# Patient Record
Sex: FEMALE | Race: WHITE | NOT HISPANIC OR LATINO | Employment: FULL TIME | URBAN - METROPOLITAN AREA
[De-identification: names, ages, dates, MRNs, and addresses within clinical notes are randomized per-mention and may not be internally consistent; named-entity substitution may affect disease eponyms.]

---

## 2023-09-25 ENCOUNTER — HOSPITAL ENCOUNTER (OUTPATIENT)
Dept: DATA CONVERSION | Facility: HOSPITAL | Age: 23
Discharge: HOME | End: 2023-09-25
Payer: MEDICAID

## 2023-09-25 DIAGNOSIS — Z11.3 ENCOUNTER FOR SCREENING FOR INFECTIONS WITH A PREDOMINANTLY SEXUAL MODE OF TRANSMISSION: ICD-10-CM

## 2023-09-25 DIAGNOSIS — Z01.419 ENCOUNTER FOR GYNECOLOGICAL EXAMINATION (GENERAL) (ROUTINE) WITHOUT ABNORMAL FINDINGS: ICD-10-CM

## 2023-09-26 LAB
C TRACH RRNA SPEC QL NAA+PROBE: ABNORMAL
DRUG SCREEN COMMENT UR-IMP: ABNORMAL
N GONORRHOEA RRNA SPEC QL NAA+PROBE: ABNORMAL
SPECIMEN SOURCE: ABNORMAL

## 2023-10-26 ENCOUNTER — LAB REQUISITION (OUTPATIENT)
Dept: LAB | Facility: HOSPITAL | Age: 23
End: 2023-10-26
Payer: MEDICAID

## 2023-10-26 DIAGNOSIS — A74.9 CHLAMYDIAL INFECTION, UNSPECIFIED: ICD-10-CM

## 2023-10-26 DIAGNOSIS — Z20.2 CONTACT WITH AND (SUSPECTED) EXPOSURE TO INFECTIONS WITH A PREDOMINANTLY SEXUAL MODE OF TRANSMISSION: ICD-10-CM

## 2023-10-26 DIAGNOSIS — Z11.3 ENCOUNTER FOR SCREENING FOR INFECTIONS WITH A PREDOMINANTLY SEXUAL MODE OF TRANSMISSION: ICD-10-CM

## 2023-10-26 PROCEDURE — 87800 DETECT AGNT MULT DNA DIREC: CPT

## 2023-10-27 LAB
C TRACH RRNA SPEC QL NAA+PROBE: NEGATIVE
N GONORRHOEA DNA SPEC QL PROBE+SIG AMP: NEGATIVE

## 2024-09-20 ENCOUNTER — OFFICE VISIT (OUTPATIENT)
Dept: URGENT CARE | Facility: URGENT CARE | Age: 24
End: 2024-09-20
Payer: COMMERCIAL

## 2024-09-20 VITALS
HEART RATE: 108 BPM | OXYGEN SATURATION: 98 % | TEMPERATURE: 98.7 F | SYSTOLIC BLOOD PRESSURE: 168 MMHG | WEIGHT: 160.5 LBS | BODY MASS INDEX: 26.71 KG/M2 | DIASTOLIC BLOOD PRESSURE: 123 MMHG | RESPIRATION RATE: 18 BRPM

## 2024-09-20 DIAGNOSIS — L03.90 CELLULITIS, UNSPECIFIED CELLULITIS SITE: ICD-10-CM

## 2024-09-20 DIAGNOSIS — L02.419 AXILLARY ABSCESS: Primary | ICD-10-CM

## 2024-09-20 PROCEDURE — 87205 SMEAR GRAM STAIN: CPT

## 2024-09-20 PROCEDURE — 87186 SC STD MICRODIL/AGAR DIL: CPT

## 2024-09-20 PROCEDURE — 87070 CULTURE OTHR SPECIMN AEROBIC: CPT

## 2024-09-20 PROCEDURE — 87075 CULTR BACTERIA EXCEPT BLOOD: CPT

## 2024-09-20 PROCEDURE — 87077 CULTURE AEROBIC IDENTIFY: CPT

## 2024-09-20 RX ORDER — LEVONORGESTREL AND ETHINYL ESTRADIOL 0.1-0.02MG
1 KIT ORAL DAILY
COMMUNITY

## 2024-09-20 RX ORDER — CLINDAMYCIN HYDROCHLORIDE 300 MG/1
300 CAPSULE ORAL 3 TIMES DAILY
Qty: 30 CAPSULE | Refills: 0 | Status: SHIPPED | OUTPATIENT
Start: 2024-09-20 | End: 2024-09-30

## 2024-09-20 RX ORDER — MUPIROCIN 20 MG/G
OINTMENT TOPICAL 3 TIMES DAILY
Qty: 22 G | Refills: 0 | Status: SHIPPED | OUTPATIENT
Start: 2024-09-20 | End: 2024-09-30

## 2024-09-20 RX ORDER — LAMOTRIGINE 150 MG/1
2 TABLET ORAL
COMMUNITY
Start: 2024-07-10

## 2024-09-20 ASSESSMENT — ENCOUNTER SYMPTOMS
CARDIOVASCULAR NEGATIVE: 1
CONSTITUTIONAL NEGATIVE: 1
NEUROLOGICAL NEGATIVE: 1
PSYCHIATRIC NEGATIVE: 1
MUSCULOSKELETAL NEGATIVE: 1
GASTROINTESTINAL NEGATIVE: 1
HEMATOLOGIC/LYMPHATIC NEGATIVE: 1

## 2024-09-20 NOTE — PROGRESS NOTES
"Subjective   Patient ID: Ranulfo Reyes is a 24 y.o. female. They present today with a chief complaint of Sore ((R) armpit, unknown cause, pustule/discharge started yesterday, sore appeared earlier this week).    History of Present Illness  24-year-old female here with pustule and papules to right axillary with pain did state that the initial pustule she was able to express some purulent discharge    Initial Was 3 days ago she did \"pop it\"  Since she has gotten to other areas increased redness and pain          Past Medical History  Allergies as of 09/20/2024    (Not on File)       (Not in a hospital admission)       No past medical history on file.    Past Surgical History:   Procedure Laterality Date    OTHER SURGICAL HISTORY  11/04/2020    Gordon tooth extraction        reports that she has never smoked. She has never used smokeless tobacco.    Review of Systems  Review of Systems   Constitutional: Negative.    HENT: Negative.     Cardiovascular: Negative.    Gastrointestinal: Negative.    Genitourinary: Negative.    Musculoskeletal: Negative.    Skin:         Patient with 2 pustules initial 1 center right mid axillary is 3 mm positive purulent discharge from site with surrounding erythema that extends an additional 4 mm    Similar pustule just inferior and posterior to the middle 1 that is 2 mm  With scant purulent discharge and surrounding erythema that is 1 to 2 mm    Third area is a papule/slightly raised erythematous plaque that is superior and medial axillary slight induration no fluctuancy this area is 6 cm x 3 cm         Neurological: Negative.    Hematological: Negative.    Psychiatric/Behavioral: Negative.     All other systems reviewed and are negative.                                 Objective    Vitals:    09/20/24 1411   BP: (!) 168/123   BP Location: Left arm   Patient Position: Sitting   BP Cuff Size: Adult   Pulse: 108   Resp: 18   Temp: 37.1 °C (98.7 °F)   TempSrc: Oral   SpO2: 98%   Weight: " "72.8 kg (160 lb 7.9 oz)     Patient's last menstrual period was 09/20/2024 (exact date).    Physical Exam  Vitals and nursing note reviewed.   Constitutional:       Appearance: Normal appearance.   HENT:      Head: Normocephalic and atraumatic.      Nose: Nose normal.      Mouth/Throat:      Mouth: Mucous membranes are moist.   Cardiovascular:      Rate and Rhythm: Normal rate.   Pulmonary:      Effort: Pulmonary effort is normal.      Breath sounds: Normal breath sounds.   Abdominal:      General: Abdomen is flat.   Musculoskeletal:         General: Normal range of motion.   Skin:     General: Skin is warm and dry.      Comments: Patient with 2 pustules initial 1 center right mid axillary is 3 mm positive purulent discharge from site with surrounding erythema that extends an additional 4 mm    Similar pustule just inferior and posterior to the middle 1 that is 2 mm  With scant purulent discharge and surrounding erythema that is 1 to 2 mm    Third area is a papule/slightly raised erythematous plaque that is superior and medial axillary slight induration no fluctuancy this area is 6 cm x 3 cm   Neurological:      General: No focal deficit present.      Mental Status: She is alert.         Procedures    Point of Care Test & Imaging Results from this visit  No results found for this visit on 09/20/24.   No results found.    Diagnostic study results (if any) were reviewed by June Hudson PA-C.    Assessment/Plan   Allergies, medications, history, and pertinent labs/EKGs/Imaging reviewed by June Hudson PA-C.     Medical Decision Making  Cellulitis, abscess axillary    Patient noted the initial center papule/pustule she did \"pop it\" since she has 2 more satellite lesions  No prior history that she knows of of MRSA  But also denies any prior history of folliculitis or other types of abscesses  This episode noted 2 to 3 days ago getting progressively worse after she popped the initial 1  Will treat her for " MRSA/abscess    Wound culture obtained     Placed on clindamycin and Bactroban ointment  Did discuss potential incision and drainage currently the 2 smaller papules are draining on their own  In the other area no fluctuance noted requiring current incision and drainage did discuss with patient that if it starts to develop increased redness sponginess to the center or any other worsening symptoms she may need to return to have incision and drainage performed on this area    Orders and Diagnoses  There are no diagnoses linked to this encounter.    Medical Admin Record      Patient disposition: Home    Electronically signed by June Hudson PA-C  2:28 PM

## 2024-09-20 NOTE — PATIENT INSTRUCTIONS
Apply the antibiotic ointment as instructed    Take antibiotics by mouth  If the symptoms get worse or do not improve or if other symptoms develop you need to get rechecked

## 2024-09-20 NOTE — LETTER
September 20, 2024     Patient: Ranulfo Reyes   YOB: 2000   Date of Visit: 9/20/2024       To Whom It May Concern:    Ranulfo Reyes was seen in my clinic on 9/20/2024 at 1:40 pm. Please excuse Ranulfo on September 20, 2024 September 21, 2024 can return to full duty September 22, 2024   If you have any questions or concerns, please don't hesitate to call.         Sincerely,         June Hudson PA-C        CC: No Recipients

## 2024-09-22 LAB
BACTERIA SPEC CULT: ABNORMAL
GRAM STN SPEC: ABNORMAL
GRAM STN SPEC: ABNORMAL

## 2024-09-23 LAB
BACTERIA SPEC CULT: ABNORMAL
GRAM STN SPEC: ABNORMAL
GRAM STN SPEC: ABNORMAL

## 2025-01-22 ENCOUNTER — APPOINTMENT (OUTPATIENT)
Dept: URGENT CARE | Age: 25
End: 2025-01-22

## 2025-02-24 ENCOUNTER — APPOINTMENT (OUTPATIENT)
Dept: RADIOLOGY | Facility: HOSPITAL | Age: 25
End: 2025-02-24
Payer: COMMERCIAL

## 2025-02-24 ENCOUNTER — HOSPITAL ENCOUNTER (EMERGENCY)
Facility: HOSPITAL | Age: 25
Discharge: HOME | End: 2025-02-24
Attending: STUDENT IN AN ORGANIZED HEALTH CARE EDUCATION/TRAINING PROGRAM
Payer: COMMERCIAL

## 2025-02-24 VITALS
OXYGEN SATURATION: 99 % | HEART RATE: 109 BPM | DIASTOLIC BLOOD PRESSURE: 90 MMHG | TEMPERATURE: 98.1 F | SYSTOLIC BLOOD PRESSURE: 143 MMHG | RESPIRATION RATE: 18 BRPM

## 2025-02-24 DIAGNOSIS — K61.0 PERIANAL ABSCESS: ICD-10-CM

## 2025-02-24 DIAGNOSIS — J02.0 STREP PHARYNGITIS: Primary | ICD-10-CM

## 2025-02-24 LAB
ABO GROUP (TYPE) IN BLOOD: NORMAL
ALBUMIN SERPL BCP-MCNC: 4.6 G/DL (ref 3.4–5)
ALP SERPL-CCNC: 64 U/L (ref 33–110)
ALT SERPL W P-5'-P-CCNC: 13 U/L (ref 7–45)
ANION GAP SERPL CALC-SCNC: 12 MMOL/L (ref 10–20)
ANTIBODY SCREEN: NORMAL
APTT PPP: 26 SECONDS (ref 27–38)
AST SERPL W P-5'-P-CCNC: 16 U/L (ref 9–39)
B-HCG SERPL-ACNC: <3 MIU/ML
BASOPHILS # BLD AUTO: 0.08 X10*3/UL (ref 0–0.1)
BASOPHILS NFR BLD AUTO: 0.6 %
BILIRUB SERPL-MCNC: 0.5 MG/DL (ref 0–1.2)
BUN SERPL-MCNC: 12 MG/DL (ref 6–23)
CALCIUM SERPL-MCNC: 9.4 MG/DL (ref 8.6–10.6)
CHLORIDE SERPL-SCNC: 106 MMOL/L (ref 98–107)
CO2 SERPL-SCNC: 27 MMOL/L (ref 21–32)
CREAT SERPL-MCNC: 0.82 MG/DL (ref 0.5–1.05)
EGFRCR SERPLBLD CKD-EPI 2021: >90 ML/MIN/1.73M*2
EOSINOPHIL # BLD AUTO: 0.02 X10*3/UL (ref 0–0.7)
EOSINOPHIL NFR BLD AUTO: 0.2 %
ERYTHROCYTE [DISTWIDTH] IN BLOOD BY AUTOMATED COUNT: 11.4 % (ref 11.5–14.5)
GLUCOSE SERPL-MCNC: 104 MG/DL (ref 74–99)
HCT VFR BLD AUTO: 36 % (ref 36–46)
HETEROPH AB SERPLBLD QL IA.RAPID: NEGATIVE
HGB BLD-MCNC: 12.5 G/DL (ref 12–16)
HOLD SPECIMEN: NORMAL
IMM GRANULOCYTES # BLD AUTO: 0.04 X10*3/UL (ref 0–0.7)
IMM GRANULOCYTES NFR BLD AUTO: 0.3 % (ref 0–0.9)
INR PPP: 1 (ref 0.9–1.1)
LYMPHOCYTES # BLD AUTO: 2.01 X10*3/UL (ref 1.2–4.8)
LYMPHOCYTES NFR BLD AUTO: 15.6 %
MCH RBC QN AUTO: 29.8 PG (ref 26–34)
MCHC RBC AUTO-ENTMCNC: 34.7 G/DL (ref 32–36)
MCV RBC AUTO: 86 FL (ref 80–100)
MONOCYTES # BLD AUTO: 0.85 X10*3/UL (ref 0.1–1)
MONOCYTES NFR BLD AUTO: 6.6 %
NEUTROPHILS # BLD AUTO: 9.91 X10*3/UL (ref 1.2–7.7)
NEUTROPHILS NFR BLD AUTO: 76.7 %
NRBC BLD-RTO: 0 /100 WBCS (ref 0–0)
PLATELET # BLD AUTO: 198 X10*3/UL (ref 150–450)
POTASSIUM SERPL-SCNC: 3.8 MMOL/L (ref 3.5–5.3)
PROT SERPL-MCNC: 7.5 G/DL (ref 6.4–8.2)
PROTHROMBIN TIME: 11.5 SECONDS (ref 9.8–12.8)
RBC # BLD AUTO: 4.19 X10*6/UL (ref 4–5.2)
RH FACTOR (ANTIGEN D): NORMAL
S PYO DNA THROAT QL NAA+PROBE: DETECTED
SODIUM SERPL-SCNC: 141 MMOL/L (ref 136–145)
WBC # BLD AUTO: 12.9 X10*3/UL (ref 4.4–11.3)

## 2025-02-24 PROCEDURE — 87651 STREP A DNA AMP PROBE: CPT

## 2025-02-24 PROCEDURE — 74177 CT ABD & PELVIS W/CONTRAST: CPT

## 2025-02-24 PROCEDURE — 99285 EMERGENCY DEPT VISIT HI MDM: CPT | Mod: 25 | Performed by: STUDENT IN AN ORGANIZED HEALTH CARE EDUCATION/TRAINING PROGRAM

## 2025-02-24 PROCEDURE — 2550000001 HC RX 255 CONTRASTS: Mod: SE | Performed by: STUDENT IN AN ORGANIZED HEALTH CARE EDUCATION/TRAINING PROGRAM

## 2025-02-24 PROCEDURE — 85730 THROMBOPLASTIN TIME PARTIAL: CPT | Performed by: STUDENT IN AN ORGANIZED HEALTH CARE EDUCATION/TRAINING PROGRAM

## 2025-02-24 PROCEDURE — 87491 CHLMYD TRACH DNA AMP PROBE: CPT | Performed by: PHYSICIAN ASSISTANT

## 2025-02-24 PROCEDURE — 87070 CULTURE OTHR SPECIMN AEROBIC: CPT

## 2025-02-24 PROCEDURE — 2500000001 HC RX 250 WO HCPCS SELF ADMINISTERED DRUGS (ALT 637 FOR MEDICARE OP)

## 2025-02-24 PROCEDURE — 86901 BLOOD TYPING SEROLOGIC RH(D): CPT | Performed by: STUDENT IN AN ORGANIZED HEALTH CARE EDUCATION/TRAINING PROGRAM

## 2025-02-24 PROCEDURE — 85610 PROTHROMBIN TIME: CPT | Performed by: STUDENT IN AN ORGANIZED HEALTH CARE EDUCATION/TRAINING PROGRAM

## 2025-02-24 PROCEDURE — 84702 CHORIONIC GONADOTROPIN TEST: CPT | Performed by: STUDENT IN AN ORGANIZED HEALTH CARE EDUCATION/TRAINING PROGRAM

## 2025-02-24 PROCEDURE — 80053 COMPREHEN METABOLIC PANEL: CPT | Performed by: STUDENT IN AN ORGANIZED HEALTH CARE EDUCATION/TRAINING PROGRAM

## 2025-02-24 PROCEDURE — 86308 HETEROPHILE ANTIBODY SCREEN: CPT

## 2025-02-24 PROCEDURE — 87661 TRICHOMONAS VAGINALIS AMPLIF: CPT | Performed by: PHYSICIAN ASSISTANT

## 2025-02-24 PROCEDURE — 36415 COLL VENOUS BLD VENIPUNCTURE: CPT | Performed by: STUDENT IN AN ORGANIZED HEALTH CARE EDUCATION/TRAINING PROGRAM

## 2025-02-24 PROCEDURE — 85025 COMPLETE CBC W/AUTO DIFF WBC: CPT | Performed by: STUDENT IN AN ORGANIZED HEALTH CARE EDUCATION/TRAINING PROGRAM

## 2025-02-24 PROCEDURE — 2500000001 HC RX 250 WO HCPCS SELF ADMINISTERED DRUGS (ALT 637 FOR MEDICARE OP): Mod: SE | Performed by: STUDENT IN AN ORGANIZED HEALTH CARE EDUCATION/TRAINING PROGRAM

## 2025-02-24 RX ORDER — ACETAMINOPHEN 325 MG/1
975 TABLET ORAL ONCE
Status: COMPLETED | OUTPATIENT
Start: 2025-02-24 | End: 2025-02-24

## 2025-02-24 RX ORDER — ONDANSETRON 4 MG/1
4 TABLET, ORALLY DISINTEGRATING ORAL EVERY 8 HOURS PRN
Qty: 20 TABLET | Refills: 0 | Status: SHIPPED | OUTPATIENT
Start: 2025-02-24 | End: 2025-03-03

## 2025-02-24 RX ORDER — FLUCONAZOLE 150 MG/1
150 TABLET ORAL ONCE
Qty: 1 TABLET | Refills: 0 | Status: SHIPPED | OUTPATIENT
Start: 2025-02-24 | End: 2025-02-24

## 2025-02-24 RX ORDER — AMOXICILLIN 500 MG/1
500 CAPSULE ORAL 2 TIMES DAILY
Qty: 20 CAPSULE | Refills: 0 | Status: SHIPPED | OUTPATIENT
Start: 2025-02-24 | End: 2025-03-06

## 2025-02-24 RX ORDER — AMOXICILLIN 500 MG/1
500 CAPSULE ORAL EVERY 12 HOURS SCHEDULED
Qty: 20 CAPSULE | Refills: 0 | Status: SHIPPED | OUTPATIENT
Start: 2025-02-24 | End: 2025-02-24 | Stop reason: WASHOUT

## 2025-02-24 RX ORDER — CLINDAMYCIN HYDROCHLORIDE 150 MG/1
450 CAPSULE ORAL 3 TIMES DAILY
Qty: 63 CAPSULE | Refills: 0 | Status: SHIPPED | OUTPATIENT
Start: 2025-02-24 | End: 2025-03-03

## 2025-02-24 RX ORDER — AMOXICILLIN 250 MG/1
500 CAPSULE ORAL EVERY 8 HOURS SCHEDULED
Status: DISCONTINUED | OUTPATIENT
Start: 2025-02-24 | End: 2025-02-24 | Stop reason: HOSPADM

## 2025-02-24 RX ADMIN — AMOXICILLIN 500 MG: 250 CAPSULE ORAL at 13:40

## 2025-02-24 RX ADMIN — ACETAMINOPHEN 975 MG: 325 TABLET ORAL at 11:46

## 2025-02-24 RX ADMIN — CLINDAMYCIN HYDROCHLORIDE 450 MG: 300 CAPSULE ORAL at 18:11

## 2025-02-24 RX ADMIN — IOHEXOL 90 ML: 350 INJECTION, SOLUTION INTRAVENOUS at 14:06

## 2025-02-24 ASSESSMENT — COLUMBIA-SUICIDE SEVERITY RATING SCALE - C-SSRS
1. IN THE PAST MONTH, HAVE YOU WISHED YOU WERE DEAD OR WISHED YOU COULD GO TO SLEEP AND NOT WAKE UP?: NO
2. HAVE YOU ACTUALLY HAD ANY THOUGHTS OF KILLING YOURSELF?: NO
6. HAVE YOU EVER DONE ANYTHING, STARTED TO DO ANYTHING, OR PREPARED TO DO ANYTHING TO END YOUR LIFE?: NO

## 2025-02-24 NOTE — ED TRIAGE NOTES
Pt reports hx of previous hemorrhoid surgery several yrs ago. Now endorses poss luis eduardo anal abscess, which has ruptured. Hx of MRSA in an axilla abscess. Denies fever chills at home. Pt denies HA, NV, dizziness, BV.

## 2025-02-24 NOTE — ED PROVIDER NOTES
History of Present Illness       Limitations to history: None  Independent Historian: patient  External Records Reviewed: EMR, outside records, Care-everywhere    HPI:  HPI   This is a 24-year-old female with a history of bipolar disorder and history of abscesses positive for MRSA, hemorrhoidectomy presenting to the ED with concern for perianal abscess.  She states that she has had a small painful area lateral to her anus for 2 years, has started to drain over the last 2 weeks.  She is concerned for fistula formation, states that she can feel gas passing through that area.  Denies diarrhea, constipation, abdominal pain.  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  Denies a cough, initially had concern for strep throat but later told the nurse and may be due to STD exposure through oral sex.  Denies abdominal pain, chest pain, shortness of breath, changes in bowel or bladder.      Physical Exam   Physical Exam  Constitutional:       General: She is not in acute distress.     Appearance: Normal appearance. She is not ill-appearing.   HENT:      Head: Normocephalic and atraumatic.      Nose: Nose normal.      Mouth/Throat:      Mouth: Mucous membranes are moist.      Pharynx: Oropharyngeal exudate present. No uvula swelling.      Tonsils: Tonsillar exudate present.   Eyes:      Conjunctiva/sclera: Conjunctivae normal.   Cardiovascular:      Rate and Rhythm: Regular rhythm. Tachycardia present.   Pulmonary:      Effort: Pulmonary effort is normal.      Breath sounds: Normal breath sounds.   Abdominal:      Palpations: Abdomen is soft.      Tenderness: There is no abdominal tenderness. There is no guarding.   Musculoskeletal:         General: Normal range of motion.      Cervical back: Normal range of motion.   Skin:     General: Skin is warm.      Findings: Abscess present.             Comments: 1 x 1 cm abscess lateral to the anus  at 9 o'clock, draining minimal fluid. Induration noted. No fluctuance    Neurological:      General: No focal deficit present.      Mental Status: She is alert.          Triage vitals:  T 36.7 °C (98.1 °F)  HR (!) 115  BP (!) 165/120  RR 20  O2 99 % None (Room air)        Medical Decision Making & ED Course     ED Course & MDM     Medical Decision Making  24-year-old female presenting to the ED with concern for perianal abscess with fistula formation, also complains of sore throat.  Vital signs reviewed, tachycardic but otherwise unremarkable.  Afebrile.  On exam, patient is resting comfortably although appears anxious, states that she has had a concern for cyst versus abscess near her anus for 2 years, however has opened and started to drain since she went spotting 2 weeks ago.  She is concerned for fistula because she says she feels air passing through the area.  Denies diarrhea, constipation, nausea, vomiting.  1 cm abscess noted lateral to the anus, approximately 9:00, with small area of induration but no fluctuance.  Draining minimal serosanguineous fluid.  Due to history of hemorrhoidectomy and concerning symptoms, will obtain basic labs and CT abdomen pelvis to rule out any acute pathology including fistula.  Will also swab for strep, mono, and obtain STD swabs of the throat due to patient's concern for chlamydia or gonorrhea in the back of her throat.    Patient tested positive for strep, will get 1 dose of amoxicillin here in the ED and sent home with amoxicillin 500 mg twice daily for strep.    CBC with WBC of 12.9, otherwise unremarkable. CMP glucose 104, otherwise WNL. CT abdomen pelvis showed findings compatible with a fistula extending to the skin along the medial left gluteal soft tissues without definite extension to the rectum or gastrointestinal tract. No abscess or significant fat stranding is identified.  Will refer her to general surgery outpatient for further intervention on this.  Patient  agreeable to plan to follow-up outpatient.  Will treat perianal abscess with clindamycin due to history of MRSA.  Culture sent.     Patient provided Diflucan due to susceptibility for yeast infections, set up with the primary care provider appointment through registration.  Discharged in stable condition with amoxicillin and clindamycin prescription sent to pharmacy    Patient was discussed and staffed with Dr Hernandez     ----    Visit Vitals  /90   Pulse (!) 109   Temp 36.7 °C (98.1 °F)   Resp 18   SpO2 99%   OB Status Having periods   Smoking Status Never        Labs Reviewed   GROUP A STREPTOCOCCUS, PCR - Abnormal       Result Value    Group A Strep PCR Detected (*)    CBC WITH AUTO DIFFERENTIAL - Abnormal    WBC 12.9 (*)     nRBC 0.0      RBC 4.19      Hemoglobin 12.5      Hematocrit 36.0      MCV 86      MCH 29.8      MCHC 34.7      RDW 11.4 (*)     Platelets 198      Neutrophils % 76.7      Immature Granulocytes %, Automated 0.3      Lymphocytes % 15.6      Monocytes % 6.6      Eosinophils % 0.2      Basophils % 0.6      Neutrophils Absolute 9.91 (*)     Immature Granulocytes Absolute, Automated 0.04      Lymphocytes Absolute 2.01      Monocytes Absolute 0.85      Eosinophils Absolute 0.02      Basophils Absolute 0.08     COMPREHENSIVE METABOLIC PANEL - Abnormal    Glucose 104 (*)     Sodium 141      Potassium 3.8      Chloride 106      Bicarbonate 27      Anion Gap 12      Urea Nitrogen 12      Creatinine 0.82      eGFR >90      Calcium 9.4      Albumin 4.6      Alkaline Phosphatase 64      Total Protein 7.5      AST 16      Bilirubin, Total 0.5      ALT 13     APTT - Abnormal    aPTT 26 (*)     Narrative:     The APTT is no longer used for monitoring Unfractionated Heparin Therapy. For monitoring Heparin Therapy, use the Heparin Assay.   MONONUCLEOSIS SCREEN (HETEROPHILE ANTIBODY) - Normal    Mononucleosis Screen Negative     PROTIME-INR - Normal    Protime 11.5      INR 1.0     HUMAN CHORIONIC  GONADOTROPIN, SERUM QUANTITATIVE - Normal    HCG, Beta-Quantitative <3      Narrative:     Total HCG measurement is performed using the Siemens Atellica immunoassay which detects intact HCG and free beta HCG subunit.  This test is not indicated for use as a tumor marker.  HCG testing is performed using a different test methodology at [facility] than other [facility] hospitals. Direct result comparison  should only be made within the same method.          TISSUE/WOUND CULTURE/SMEAR   TYPE AND SCREEN    ABO TYPE A      Rh TYPE POS      ANTIBODY SCREEN NEG     EXTRA WET PREP TUBE    Extra Tube Hold for add-ons.     C. TRACHOMATIS / N. GONORRHOEAE, AMPLIFIED, THROAT   TRICH VAGINALIS, AMPLIFIED       CT abdomen pelvis w IV contrast   Preliminary Result   Findings compatible with a fistula extending to the skin along the   medial left gluteal soft tissues without definite extension to the   rectum or gastrointestinal tract. No abscess or significant fat   stranding is identified. MRI of the rectum may be considered for   further evaluation as clinically indicated.        I personally reviewed the images/study and I agree with the findings   as stated by Linda Carlton MD (PGY-2). This study was interpreted at   [facility], [place], [place].        MACRO:   None             Dictation workstation:   [id number]          ED Course:  ED Course as of 02/24/25 1904   Mon Feb 24, 2025   1136 21-year-old female history of abscess presenting with left-sided perianal abscess status started to drain.  Also reports sore throat  On exam she has patent airway, erythema posterior oropharynx with tonsillar exudates bilaterally.  Midline uvula.  Speaking clearly in full sentences  1x1 cm area of erythema with underlying area 2 x 2 induration on the left buttock (9:00 relative to the anus).  No crepitus or purulent drainage.  There is serosanguineous drainage  MDM  24-year-old female presenting  with sore throat.  Concern for strep/mono/viral infection.  Will get strep and mono testing.  Do not suspect RPA given patient has no difficulty with neck movement  Given location of the abscess will evaluate for fistula/relation to the anus.  Given that there is no crepitus or systemic complaints do not suspect Aryan's gangrene at this time.  Patient does not have any systemic complaints and was not empirically treated with antibiotics  Will give pain medications [FN]   1331 Group A Strep PCR(!): Detected [FN]   1335 rx'd amoxicillin 500 mg twice daily for 10 days for strep pharyngitis [FN]   1522 HCG, Beta-Quantitative: <3 [FN]      ED Course User Index  [FN] Catarina Hernandez MD         Diagnoses as of 02/24/25 1904   Strep pharyngitis   Perianal abscess     Disposition   As result of the workup, the patient was discharged home.  Patient was informed of their diagnosis and instructed to come back with any concerns or worsening of condition, agreeable to the plan above.  Patient given the opportunity ask questions, all of the patient's questions were answered.  Patient given general surgery contact number for follow up.     Procedures   Procedures    Yesenia Jewell PA-C  Emergency Medicine      Yesenia Jewell PA-C  02/24/25 1906

## 2025-02-24 NOTE — Clinical Note
Ranulfo Reyes was seen and treated in our emergency department on 2/24/2025.  She may return to work on 02/26/2025.       If you have any questions or concerns, please don't hesitate to call.      Yesenia Jewell PA-C

## 2025-02-24 NOTE — DISCHARGE INSTRUCTIONS
Take amoxicillin twice a day for 10 days for strep. Take clindamycin three times a day for 7 days for the abscess. Follow up with general surgery in 1-2 weeks regarding the fistula.

## 2025-02-25 LAB
C TRACH RRNA SPEC QL NAA+PROBE: NEGATIVE
N GONORRHOEA DNA SPEC QL PROBE+SIG AMP: NEGATIVE
T VAGINALIS RRNA SPEC QL NAA+PROBE: NEGATIVE

## 2025-03-10 ENCOUNTER — APPOINTMENT (OUTPATIENT)
Dept: SURGERY | Facility: CLINIC | Age: 25
End: 2025-03-10
Payer: COMMERCIAL

## 2025-03-10 VITALS — BODY MASS INDEX: 26.29 KG/M2 | DIASTOLIC BLOOD PRESSURE: 103 MMHG | WEIGHT: 158 LBS | SYSTOLIC BLOOD PRESSURE: 153 MMHG

## 2025-03-10 DIAGNOSIS — K60.30 ANAL FISTULA: Primary | ICD-10-CM

## 2025-03-10 DIAGNOSIS — K61.0 PERIANAL ABSCESS: ICD-10-CM

## 2025-03-10 PROCEDURE — 99213 OFFICE O/P EST LOW 20 MIN: CPT | Performed by: SURGERY

## 2025-03-10 PROCEDURE — 1036F TOBACCO NON-USER: CPT | Performed by: SURGERY

## 2025-03-10 NOTE — PROGRESS NOTES
General Surgery History and Physical    Referring Provider:  No primary care provider on file.  Yesenia Jewell PA-C     Chief Complaint:  No chief complaint on file.      History of Present Illness:  Ranulfo Reyes is a 24 y.o. female   History of hemorrhoidectomy about 2yrs ago   Was having discomfort and painful lump on the left side of anus  It drained about 1 month ago, then healed on it's own   Was seen in ED at [facility] on 2/24 for concern of fistula   Per patient, she could feel passage of gas through the area   Had CT which showed:   Findings compatible with a fistula extending to the skin along the  medial left gluteal soft tissues without definite extension to the  rectum or gastrointestinal tract. No localized fluid collection or  significant fat stranding is identified. Dedicated MRI of the rectum  may be considered for further evaluation as clinically indicated.    She came today for follow up   Currently, discomfort, but not pain or drainage       Past Medical History:  History reviewed. No pertinent past medical history.     Past Surgical History:  Past Surgical History:   Procedure Laterality Date    OTHER SURGICAL HISTORY  11/04/2020    Gem tooth extraction        Medications:  Current Outpatient Medications   Medication Instructions    lamoTRIgine (LaMICtal) 150 mg tablet 2 tablets, oral, Daily (0630)    Sronyx 0.1-20 mg-mcg tablet 1 tablet, oral, Daily        Allergies:  No Known Allergies     Family History:  No family history on file.     Social History:  Social History     Socioeconomic History    Marital status: Single   Tobacco Use    Smoking status: Never    Smokeless tobacco: Never     Social Drivers of Health     Financial Resource Strain: Not on File (5/21/2021)    Received from [facility]    Financial Resource Strain     Financial Resource Strain: 0   Food Insecurity: Not on File (9/26/2024)    Received from [facility]    Food Insecurity     Food: 0   Transportation Needs: Not on File  (2021)    Received from [facility]    Transportation Needs     Transportation: 0   Physical Activity: Not on File (2021)    Received from [facility]    Physical Activity     Physical Activity: 0   Stress: Not on File (2021)    Received from [facility]    Stress     Stress: 0   Social Connections: Not on File (2024)    Received from [facility]    Social Connections     Connectedness: 0   Housing Stability: Not on File (2021)    Received from [facility]    Housing Stability     Housin        Review of Systems:  A complete 12 point review of systems was performed. Please see scanned questionnaire and is otherwise negative except as noted in the history of present illness.    Vital Signs:  Vitals:    03/10/25 1058   BP: (!) 153/103      Body mass index is 26.29 kg/m².      Physical Exam:  General: No acute distress.   Neuro: Alert and oriented ×3. Follows commands.  Face: Atraumatic, no visible skin lesion.   Head: Atraumatic  Eyes: Pupils equal reactive to light. Extraocular motions intact.  Ears: Hears normal speaking voice.  Mouth, Nose, Throat: Mucous membranes moist.    Neck: Supple. No visible masses.  Breast: Not examined.   Lung: Patent airway and no labored breath.   Rectal and Perianal: Not examined.   Small area on the left side with healed firm area. No sign of abscess. At least 7-8cm from verge.   VY not done due to discomfort. Complete exam requires EUA.   Genitourinary: Not examined.   Musculoskeletal: Moves all extremities.    Extremities: No cyanosis. No edema.   Lymphatic: No palpable lymph nodes.  Skin: No rashes or lesions.  Psychological: Normal affect      Laboratory Values:  CBC:   Lab Results   Component Value Date    WBC 12.9 (H) 2025    RBC 4.19 2025    HGB 12.5 2025    HCT 36.0 2025     2025       RFP:   Lab Results   Component Value Date     2025    K 3.8 2025     2025    CO2 27 2025     BUN 12 02/24/2025    CREATININE 0.82 02/24/2025    CALCIUM 9.4 02/24/2025        LFTs:   Lab Results   Component Value Date    PROT 7.5 02/24/2025    ALBUMIN 4.6 02/24/2025    BILITOT 0.5 02/24/2025    ALKPHOS 64 02/24/2025    AST 16 02/24/2025    ALT 13 02/24/2025            Imaging:  I have personally reviewed the images and the radiologist's report.  CT abdomen pelvis w IV contrast    Result Date: 2/25/2025  Interpreted By:  Aleksey Villa  and Brandt Mckeon STUDY: CT ABDOMEN PELVIS W IV CONTRAST;  2/24/2025 2:13 pm   INDICATION: Signs/Symptoms:Concern for perianal abscess at 9:00 relative to the anus. history of abscess; need to evaluate for fistula -- scan down to mid thigh.     COMPARISON: None.   ACCESSION NUMBER(S): [id number]   ORDERING CLINICIAN: ALFRED SERRATO   TECHNIQUE: CT of the abdomen and pelvis was performed.  Standard contiguous axial images were obtained at 3 mm slice thickness through the abdomen and pelvis. Coronal and sagittal reconstructions at 3 mm slice thickness were performed.  90 ML of Omnipaque 350 was administered intravenously without immediate complication.   FINDINGS: LOWER CHEST: The visualized lung base is unremarkable. The heart is normal in size without pericardial effusion. No pleural effusion is present. Visualized distal esophagus appears normal.   ABDOMEN:   LIVER: The liver is normal in size without evidence of focal liver lesions.   BILE DUCTS: The intrahepatic and extrahepatic ducts are not dilated.   GALLBLADDER: The gallbladder is nondistended and without evidence of radiopaque stones.   PANCREAS: The pancreas appears unremarkable without evidence of ductal dilatation or masses.   SPLEEN: The spleen is normal in size without focal lesions.   ADRENAL GLANDS: Bilateral adrenal glands appear normal.   KIDNEYS AND URETERS: The kidneys are normal in size and enhance symmetrically.  No hydroureteronephrosis or nephroureterolithiasis is identified.   PELVIS:   BLADDER: The urinary  bladder appears normal without abnormal wall thickening.   REPRODUCTIVE ORGANS: The uterus is present.   BOWEL: Soft tissue density tract extending from the skin along the medial left gluteal subcutaneous soft tissues without definite connection to the rectum (series 201, image 143; series 203, image 71); there is no localized fluid collection about this area or significant fat stranding. The stomach, small and large bowel are normal in appearance without wall thickening or obstruction. The appendix is not definitely visualized. There is however no pericecal stranding or fluid.   VESSELS: There is no aneurysmal dilatation of the abdominal aorta. The IVC appears normal.   PERITONEUM/RETROPERITONEUM/LYMPH NODES: No ascites or free air, no fluid collection.  No abdominopelvic lymphadenopathy is present.   BONES AND ABDOMINAL WALL: No suspicious osseous lesions are identified.  The abdominal wall soft tissues appear normal.       Findings compatible with a fistula extending to the skin along the medial left gluteal soft tissues without definite extension to the rectum or gastrointestinal tract. No localized fluid collection or significant fat stranding is identified. Dedicated MRI of the rectum may be considered for further evaluation as clinically indicated.   I personally reviewed the images/study and I agree with the findings as stated by Linda Carlton MD (PGY-2). This study was interpreted at [place], [place].   MACRO: None   Signed by: Aleksey Villa 2/25/2025 7:02 AM Dictation workstation:   [id number]        Assessment:  This is a 24 y.o. female who presents with follow conditions:   Anal fistula   Family history of Crohn's disease: grandmother     Plan:  Explained that due to relatively long distance between anal verge and outside drainage site, likelihood of sphincter involvement in increased. She will need to follow up colorectal surgery for further evaluation (EUA).  Will refer to [facility] Lux at [facility]. May need further work up to rule out IBD as well.       Alvaro Montano MD [facility]  General Surgery  Office: [phone]  Fax:     [phone]  11:47 AM   03/10/25

## 2025-03-19 PROBLEM — F31.10 BIPOLAR DISORDER, MANIC (MULTI): Status: ACTIVE | Noted: 2025-03-19

## 2025-03-19 PROBLEM — K64.4 RESIDUAL HEMORRHOIDAL SKIN TAGS: Status: ACTIVE | Noted: 2025-03-19

## 2025-03-19 PROBLEM — Z20.822 EXPOSURE TO COVID-19 VIRUS: Status: ACTIVE | Noted: 2025-03-19

## 2025-03-19 PROBLEM — R45.851 SUICIDAL THOUGHTS: Status: ACTIVE | Noted: 2025-03-19

## 2025-03-19 PROBLEM — E66.01 OBESITY, CLASS III, BMI 40-49.9 (MORBID OBESITY) (MULTI): Status: ACTIVE | Noted: 2021-02-24

## 2025-03-19 PROBLEM — E66.813 OBESITY, CLASS III, BMI 40-49.9 (MORBID OBESITY): Status: ACTIVE | Noted: 2021-02-24

## 2025-03-19 PROBLEM — F31.9 BIPOLAR 1 DISORDER (MULTI): Chronic | Status: ACTIVE | Noted: 2021-02-22

## 2025-03-19 PROBLEM — J01.90 ACUTE SINUSITIS: Status: ACTIVE | Noted: 2025-03-19

## 2025-03-19 PROBLEM — F32.0 MAJOR DEPRESSIVE DISORDER, SINGLE EPISODE, MILD (CMS-HCC): Status: ACTIVE | Noted: 2025-03-19

## 2025-03-19 PROBLEM — R11.2 NAUSEA AND VOMITING: Status: ACTIVE | Noted: 2025-03-19

## 2025-03-19 PROBLEM — R03.0 ELEVATED BLOOD PRESSURE READING WITHOUT DIAGNOSIS OF HYPERTENSION: Status: ACTIVE | Noted: 2025-03-19

## 2025-03-19 PROBLEM — G25.71 AKATHISIA: Status: ACTIVE | Noted: 2021-06-16

## 2025-03-19 PROBLEM — F34.1 PERSISTENT DEPRESSIVE DISORDER: Status: ACTIVE | Noted: 2021-06-08

## 2025-03-19 PROBLEM — K64.5: Status: ACTIVE | Noted: 2025-03-19

## 2025-03-19 PROBLEM — E55.9 VITAMIN D DEFICIENCY: Status: ACTIVE | Noted: 2025-03-19

## 2025-03-19 PROBLEM — F30.9 MANIC EPISODE, UNSPECIFIED: Chronic | Status: ACTIVE | Noted: 2021-06-02

## 2025-03-19 RX ORDER — NORETHINDRONE ACETATE AND ETHINYL ESTRADIOL, ETHINYL ESTRADIOL AND FERROUS FUMARATE 1MG-10(24)
KIT ORAL
COMMUNITY

## 2025-03-28 ENCOUNTER — APPOINTMENT (OUTPATIENT)
Dept: SURGERY | Facility: CLINIC | Age: 25
End: 2025-03-28
Payer: COMMERCIAL

## 2025-03-28 NOTE — PROGRESS NOTES
History Of Present Illness  Ranulfo Reyes is a 24 y.o. female presenting with ***.  Referred by Dr. Montano for perianal abscess and fistula  CT completed 02.24.2025   Past Medical History  She has no past medical history on file.    Surgical History  She has a past surgical history that includes Other surgical history (11/04/2020).     Social History  She reports that she has never smoked. She has never used smokeless tobacco. No history on file for alcohol use and drug use.    Family History  No family history on file.     Allergies  Patient has no known allergies.    Review of Systems     Physical Exam     Last Recorded Vitals  There were no vitals taken for this visit.    Relevant Results  {If you would like to pull in Lab results for the last 24 hours, type .[id number] :99}  {If you would like to pull in Imaging results, type .imgrslt :99}    Scheduled medications    Continuous medications    PRN medications      ***     Assessment/Plan   {Assess/PlanSmartLinks:63980}    ***           Lurdes Yang LPN

## 2025-04-18 ENCOUNTER — OFFICE VISIT (OUTPATIENT)
Dept: SURGERY | Facility: CLINIC | Age: 25
End: 2025-04-18
Payer: COMMERCIAL

## 2025-04-18 VITALS
SYSTOLIC BLOOD PRESSURE: 134 MMHG | HEART RATE: 89 BPM | DIASTOLIC BLOOD PRESSURE: 94 MMHG | WEIGHT: 153 LBS | TEMPERATURE: 98.2 F | HEIGHT: 65 IN | BODY MASS INDEX: 25.49 KG/M2

## 2025-04-18 DIAGNOSIS — K60.30 ANAL FISTULA: ICD-10-CM

## 2025-04-18 PROCEDURE — 1036F TOBACCO NON-USER: CPT | Performed by: STUDENT IN AN ORGANIZED HEALTH CARE EDUCATION/TRAINING PROGRAM

## 2025-04-18 PROCEDURE — 46600 DIAGNOSTIC ANOSCOPY SPX: CPT | Performed by: STUDENT IN AN ORGANIZED HEALTH CARE EDUCATION/TRAINING PROGRAM

## 2025-04-18 PROCEDURE — 99214 OFFICE O/P EST MOD 30 MIN: CPT | Mod: 25 | Performed by: STUDENT IN AN ORGANIZED HEALTH CARE EDUCATION/TRAINING PROGRAM

## 2025-04-18 PROCEDURE — 3008F BODY MASS INDEX DOCD: CPT | Performed by: STUDENT IN AN ORGANIZED HEALTH CARE EDUCATION/TRAINING PROGRAM

## 2025-04-18 PROCEDURE — 99204 OFFICE O/P NEW MOD 45 MIN: CPT | Performed by: STUDENT IN AN ORGANIZED HEALTH CARE EDUCATION/TRAINING PROGRAM

## 2025-04-18 ASSESSMENT — ENCOUNTER SYMPTOMS
UNEXPECTED WEIGHT CHANGE: 0
BRUISES/BLEEDS EASILY: 0
VOMITING: 0
PALPITATIONS: 0
ADENOPATHY: 0
ABDOMINAL PAIN: 0
SORE THROAT: 0
VOICE CHANGE: 0
TROUBLE SWALLOWING: 0
WOUND: 1
DIARRHEA: 0
FEVER: 0
BLOOD IN STOOL: 0
SPEECH DIFFICULTY: 0
CHEST TIGHTNESS: 0
CHILLS: 0
ARTHRALGIAS: 0
NAUSEA: 0
FACIAL ASYMMETRY: 0
HEADACHES: 0
HEMATURIA: 0
DYSURIA: 0
SHORTNESS OF BREATH: 0

## 2025-04-18 ASSESSMENT — PAIN SCALES - GENERAL: PAINLEVEL_OUTOF10: 0-NO PAIN

## 2025-04-18 ASSESSMENT — PATIENT HEALTH QUESTIONNAIRE - PHQ9
2. FEELING DOWN, DEPRESSED OR HOPELESS: NOT AT ALL
SUM OF ALL RESPONSES TO PHQ9 QUESTIONS 1 AND 2: 0
1. LITTLE INTEREST OR PLEASURE IN DOING THINGS: NOT AT ALL

## 2025-04-18 NOTE — PROGRESS NOTES
History Of Present Illness  Ranulfo Reyes is a 25 y.o. female presenting for evaluation of anal fistula.  She reports she had hemorrhoid surgery about 2 years ago and a few months after that developed a small raised lesion in her left perianal space.  It would intermittently swell and become more painful however about 2 weeks ago it started to drain.  Now with intermittent drainage.  Discomfort with walking.     Past Medical History  She has a past medical history of Bipolar disorder.    Surgical History  She has a past surgical history that includes Other surgical history (11/04/2020) and Hemorrhoid surgery (06/2024).     Social History  She reports that she has never smoked. She has never used smokeless tobacco. She reports current alcohol use of about 2.0 standard drinks of alcohol per week. She reports that she does not use drugs.    Family History  Family History[1]     Allergies  Patient has no known allergies.    Review of Systems   Constitutional:  Negative for chills, fever and unexpected weight change.   HENT:  Negative for sneezing, sore throat, trouble swallowing and voice change.    Respiratory:  Negative for chest tightness and shortness of breath.    Cardiovascular:  Negative for chest pain and palpitations.   Gastrointestinal:  Negative for abdominal pain, blood in stool, diarrhea, nausea and vomiting.   Endocrine: Negative for cold intolerance and heat intolerance.   Genitourinary:  Negative for decreased urine volume, dysuria and hematuria.   Musculoskeletal:  Negative for arthralgias and gait problem.   Skin:  Positive for wound. Negative for rash.   Neurological:  Negative for facial asymmetry, speech difficulty and headaches.   Hematological:  Negative for adenopathy. Does not bruise/bleed easily.   Psychiatric/Behavioral:  Negative for self-injury and suicidal ideas.         Physical Exam  Vitals and nursing note reviewed.   Constitutional:       Appearance: Normal appearance.   HENT:       "Head: Normocephalic and atraumatic.      Mouth/Throat:      Mouth: Mucous membranes are moist.      Pharynx: Oropharynx is clear.   Eyes:      Extraocular Movements: Extraocular movements intact.      Pupils: Pupils are equal, round, and reactive to light.   Cardiovascular:      Rate and Rhythm: Normal rate and regular rhythm.      Pulses: Normal pulses.   Pulmonary:      Effort: Pulmonary effort is normal.      Breath sounds: Normal breath sounds.   Abdominal:      General: There is no distension.      Palpations: Abdomen is soft.      Tenderness: There is no abdominal tenderness.   Genitourinary:      Musculoskeletal:      Cervical back: Normal range of motion and neck supple.   Skin:     General: Skin is warm and dry.   Neurological:      General: No focal deficit present.      Mental Status: She is alert and oriented to person, place, and time.   Psychiatric:         Mood and Affect: Mood normal.         Behavior: Behavior normal.          Last Recorded Vitals  Blood pressure (!) 134/94, pulse 89, temperature 36.8 °C (98.2 °F), temperature source Oral, height 1.651 m (5' 5\"), weight 69.4 kg (153 lb).    Relevant Results  Reviewed ER visit and CT scan     Assessment/Plan   Problem List Items Addressed This Visit    None  Visit Diagnoses         Codes      Anal fistula     K60.30          Perianal fistula.  It is unclear if this is crypto glandular or secondary to hemorrhoid surgery.  I described the etiology of both using a drawing in clinic.  Explained that the treatment will depend on the amount of sphincter muscle that is involved; there it is superficial, intersphincteric or Lama sphincteric.  Recommended we start with an anorectal exam under anesthesia to delineate the anatomy and either perform a fistulotomy if it is superficial or intersphincteric, or place a seton if Lama sphincteric, which would require a subsequent surgery for definitive repair.  All questions were answered.  Will schedule at her " convenience at [facility].      Mayelin Lux MD    Patient ID: Ranulfo Reyes is a 25 y.o. female.    Anoscopy    Date/Time: 4/18/2025 2:00 PM    Performed by: Mayelin Lux MD  Authorized by: Mayelin Lux MD    Consent:     Consent obtained:  Verbal and written    Consent given by:  Patient    Risks, benefits, and alternatives were discussed: yes      Risks discussed:  Bleeding and pain    Alternatives discussed:  No treatment and alternative treatment  Universal protocol:     Procedure explained and questions answered to patient or proxy's satisfaction: yes      Immediately prior to procedure, a time out was called: yes      Patient identity confirmed:  Verbally with patient  Indications:     Indications comment:  Fistula  Procedure details:     Anal fistulae: yes    Post-procedure details:     Procedure completion:  Tolerated well, no immediate complications         [1]   Family History  Problem Relation Name Age of Onset    Multiple sclerosis Mother      Crohn's disease Maternal Grandmother      Diabetes type II Maternal Grandfather

## 2025-04-18 NOTE — PATIENT INSTRUCTIONS
Thank you for scheduling surgery with Dr. Lux.   Below you will find your Surgery Itinerary to include dates, times, and locations for appointments involved with your procedure.    Pre Admission Testing at [facility] - [address], [place] [address] on WEDNESDAY, MAY 14, 2025    On the day before the scheduled surgery, please call the Same Day Surgery department between 2-4 pm for a time of arrival for the day of procedure.  [facility] - [address], [place] [address]    Nothing to eat or drink after midnight the night before surgery    Surgery with Dr. Lux at [facility] - [address] [place] [address], [place] [address] on WEDNESDAY, MAY 14, 2025    Postoperative appointment is scheduled at [facility] General Surgery office: [address], [place] [address]  On 6/9/25 @ 2pm    *Please note, you may receive a call from our financial counselors if you have a financial liability greater than $250.     Thank you for choosing [facility]!         [facility]  PRE - OPERATIVE Instructions     Before surgery, you should follow these important safety rules. If not followed, your operation may have to be delayed or canceled.  Do NOT eat or drink anything after midnight the night before your surgery unless your doctor or anesthesiologist instructs otherwise. This includes food, liquids, water, candy, gum, and chewing tobacco; also do not smoke after midnight the night before surgery. Not following these instructions can cause serious complications or may cause your surgery to be delayed or cancelled.    No alcohol 24 hours before or after.    Do NOT take Insulin the day of surgery.   Patients on GLP-inhibitors  oral and injectable along with SGLT2 inhibitors please read the following:      SGLT2 inhibitors   Ertugliflozin (Steglatro) Stop a full 4 days before surgical/procedure date   Bexagliflozin (Brenzavvy) Stop a full 3 days before  surgical/procedure date   Canagliflozin (Invokana) Stop a full 3 days before surgical/procedure date   Dapagliflozin (Farxiga) Stop a full 3 days before surgical/procedure date   Emagliflozin (Jardiance) Stop a full 3 days before surgical/procedure date     GLP-1 Inhibitors oral:   Semaglutide (Rybelus) Hold day of surgery only     GLP-1 Inhibitors Injection weekly   Dulaglutide (Trulicity) Stop a full 4 days before surgical/procedure date   Exenatide ER (Bydyreon, Bcise) Stop a full 3 days before surgical/procedure date   Semiglutide (Ozempic, Wegovy) Stop a full 3 days before surgical/procedure date     GLP-1 inhibitors injection twice a day:   Exenatide IR (Byetta) Hold day of surgery only     GLP-1 inhibitors injection daily:   Liraglutide (Saxenda, Victoza) Hold day of surgery only   Lixisenatide (Adlyxin) Hold day of surgery only       Patients on Anti-platelet and Anticoagulant agents, please read the following:  Coumadin stop 5 days prior to surgery unless bridging therapy is needed (metal valve replacement, cardiac stent placement) - if so please speak to your Cardiologist or prescribing physician.   Other anti-platelet and anticoagulant agents:    STOP 24 HOURS PRIOR   Lovenox (Enoxaparin)     STOP 3 DAYS PRIOR   Xarelto (Rivaroxaban)   Eliquis (Apixaban)   Savaysa (Endoxaban)     STOP 5 DAYS PRIOR   ASA, NSAIDS   Plavix (Clopidogrel)   Brilinta (Ticagrelor)   Arixtra (Fondaparinux)   Pradaxa (Dabigatran)     STOP 7 DAYS PRIOR   Effient (Prasugrel)       You may take your normal heart, blood pressure, breathing & seizure medicine the morning of surgery with a small sip of water.    Do not bring jewelry or other valuables.    Remove all jewelry, makeup, nail polish, and piercing's prior to arrival.      DAY OF SURGERY Instructions      You should bring the following with you:  Completed Pre-Surgical Assessment form if you did not have pre-admission testing.  Leave valuables in car or with . Do not  bring purse  Insurance cards or forms  Contact lens container or glass case  Crutches/Walker (if appropriate)  Wear loose, comfortable clothing and low-heeled shoes        Please bring a copy of any Advanced Directives the day of surgery if you did not provide it at Pre Admission Testing. These documents are living schulz and durable power of  for healthcare.     Please notify your physician/surgeon if you develop a cold, sore throat, fever, flu symptoms, COVID symptoms or any changes in your physical conditions.     A shower or bath is preferred the evening before or the morning of surgery.     Patients under the age of 18 must be accompanied by a parent(s) or legal guardian.     You must have a responsible  at least 18 years or older available to take you home after surgery.   Taxi/bus transportation is allowed only for patients having local anesthesia or if the patient is accompanied by an adult escort.          For questions or changes in scheduling or Pre Admission Testing (PAT)  [facility] at [phone]  [email or web address]  [address]            [facility]  PATIENT HEALTH HISTORY  **please complete and bring with you**      NAME: _____________________________________________DATE______________    Reason for Admission_________________________________________________________________    Phone number ______________Cell: ________________Family Physician_______________________                                                                                              Other Physicians___________________________           LIST ALLERGIES /REACTIONS                                                        NO       YES      Reaction  Allergy to Latex (Rubber)      Allergic to Tape, band aids, adhesive      Have you had a reaction to Anesthesia      Has a blood relative had a reaction to Anesthesia      History of Malignant Hyperthermia with you/relative      History of  nausea/vomiting with Anesthesia        Please list prescription and over the counter medications presently taken.  Include any eye drops, vitamins,   oxygen, inhalers, herbs, non-prescription pain medications, etc.    MEDICATION NAME              DOSE AND FREQUENCY               REASON TAKEN                                                              Resume All Medications After Surgery    Yes       No      LIST ALL PRIOR SURGERIES, HOSPITALIZATIONS, AND DATES:  _________________________________________________________________________________________  _________________________________________________________________________________________  _________________________________________________________________________________________  _________________________________________________________________________________________  _________________________________________________________________________________________  _________________________________________________________________________________________  ___________________________________________________________________________________________      yes  no   yes no   Cold/sore throat past 2 weeks?     Chipped/loose/missing teeth?       Chronic or frequent coughs?     Physical prosthesis?       Shortness of breath?     Plates/Pins/Screws? If so, where?       Sleep apnea? If so, C-PAP? O2 use?     Body piercings? If so, where?       Emphysema, COPD, asthma?     Low Blood Pressure?       Other lung problems? TB, pneumonia?     High Blood pressure?       Do you smoke? Vape? Tobacco?     Rheumatic fever? Heart murmur?            If so, how much?     Chest pain? Angina? Heart Attack?             How long?     Congestive heart failure? Mitral Valve Prolapse?            Quit? If so, when?     Atrial Fibrillation? Fast or Irregular Heartbeat?       Have you ever been on steroids?     Pacemaker? AICD? Cardiac stents?            If so, when?     Back, neck pain or other injuries?             Cortisone, prednisone?     Nerve Stimulator? Pain management?       Drug use? (opioids, marijuana, etc.?)     MS? Parkinson's? Muscular Problems?            If so, what?     Epilepsy? Seizures?       Alcohol?          If so, when was your last seizure?            How much? How often?     Stroke? Paralysis? TIA?       Cancer?     Have you ever had a blood transfusion?            Type:     You or relative have history of bleeding or             Radiation? Chemo? Med-port? ([place])     clotting problems? DVT's? Blood clots?       Psychiatric Problems?     OTHER HEALTH ISSUES       (anxiety, depression, bipolar, etc.)     FEMALES ONLY       Dizziness, vertigo, motion sickness?     Possibility of being pregnant?       Kidney or bladder problems? Stent?     Last menstrual period?        Diabetes? (high blood sugar)          Date:       Thyroid Problems?     Menopause?        Hiatal Hernia, Ulcer, GERD, Reflux?     Hysterectomy?       Hepatitis, cirrhosis, liver disease, jaundice?     PEDIATRIC PATIENTS        TMJ? Difficulty opening mouth widely?     Parent/Sibling allergies? Medical History?        Anemia, sickle cell?     Developmental delays? Autism? ADD? ADHD?        Glasses? Contacts? Hearing Aids? R:        L:     Learning Disability? Premature Birth?       Dentures? Bridges? Caps? Crowns? Braces?      Comments:         Review date _________________         No Changes      Updated    Initials ______       Review date _________________         No Changes      Updated    Initials ______       Review date _________________         No Changes      Updated    Initials ______       Review date _________________         No Changes      Updated    Initials ______               Revised 4/2023 form # [address] [place] [address]  PATIENT HEALTH HISTORY  **please complete and bring with you**      NAME: _____________________________________________DATE______________    Reason for  Admission_________________________________________________________________    Phone number ______________Cell: ________________Family Physician_______________________                                                                                              Other Physicians___________________________           LIST ALLERGIES /REACTIONS                                                        NO       YES      Reaction  Allergy to Latex (Rubber)      Allergic to Tape, band aids, adhesive      Have you had a reaction to Anesthesia      Has a blood relative had a reaction to Anesthesia      History of Malignant Hyperthermia with you/relative      History of nausea/vomiting with Anesthesia        Please list prescription and over the counter medications presently taken.  Include any eye drops, vitamins,   oxygen, inhalers, herbs, non-prescription pain medications, etc.    MEDICATION NAME              DOSE AND FREQUENCY               REASON TAKEN                                                              Resume All Medications After Surgery    Yes       No      LIST ALL PRIOR SURGERIES, HOSPITALIZATIONS, AND DATES:  _________________________________________________________________________________________  _________________________________________________________________________________________  _________________________________________________________________________________________  _________________________________________________________________________________________  _________________________________________________________________________________________  _________________________________________________________________________________________  ___________________________________________________________________________________________      yes  no   yes no   Cold/sore throat past 2 weeks?     Chipped/loose/missing teeth?       Chronic or frequent coughs?     Physical prosthesis?       Shortness of breath?      Plates/Pins/Screws? If so, where?       Sleep apnea? If so, C-PAP? O2 use?     Body piercings? If so, where?       Emphysema, COPD, asthma?     Low Blood Pressure?       Other lung problems? TB, pneumonia?     High Blood pressure?       Do you smoke? Vape? Tobacco?     Rheumatic fever? Heart murmur?            If so, how much?     Chest pain? Angina? Heart Attack?             How long?     Congestive heart failure? Mitral Valve Prolapse?            Quit? If so, when?     Atrial Fibrillation? Fast or Irregular Heartbeat?       Have you ever been on steroids?     Pacemaker? AICD? Cardiac stents?            If so, when?     Back, neck pain or other injuries?            Cortisone, prednisone?     Nerve Stimulator? Pain management?       Drug use? (opioids, marijuana, etc.?)     MS? Parkinson's? Muscular Problems?            If so, what?     Epilepsy? Seizures?       Alcohol?          If so, when was your last seizure?            How much? How often?     Stroke? Paralysis? TIA?       Cancer?     Have you ever had a blood transfusion?            Type:     You or relative have history of bleeding or             Radiation? Chemo? Med-port? ([place])     clotting problems? DVT's? Blood clots?       Psychiatric Problems?     OTHER HEALTH ISSUES       (anxiety, depression, bipolar, etc.)     FEMALES ONLY       Dizziness, vertigo, motion sickness?     Possibility of being pregnant?       Kidney or bladder problems? Stent?     Last menstrual period?        Diabetes? (high blood sugar)          Date:       Thyroid Problems?     Menopause?        Hiatal Hernia, Ulcer, GERD, Reflux?     Hysterectomy?       Hepatitis, cirrhosis, liver disease, jaundice?     PEDIATRIC PATIENTS        TMJ? Difficulty opening mouth widely?     Parent/Sibling allergies? Medical History?        Anemia, sickle cell?     Developmental delays? Autism? ADD? ADHD?        Glasses? Contacts? Hearing Aids? R:        L:     Learning Disability? Premature Birth?        Dentures? Bridges? Caps? Crowns? Braces?      Comments:         Review date _________________         No Changes      Updated    Initials ______       Review date _________________         No Changes      Updated    Initials ______       Review date _________________         No Changes      Updated    Initials ______       Review date _________________         No Changes      Updated    Initials ______               Revised 4/2023 form # 1023

## 2025-05-01 ENCOUNTER — TELEPHONE (OUTPATIENT)
Dept: SURGERY | Facility: CLINIC | Age: 25
End: 2025-05-01
Payer: COMMERCIAL

## 2025-05-01 NOTE — TELEPHONE ENCOUNTER
Patient is scheduled for surgery with Dr. Lux at the [facility] on 5/14/2025.  Per patient's insurance, [employer], precert request for CPT code 63587 has been AUTHORIZED from 4/21/2025-7/21/2025.  Authorization number is [id number], insurance determination scanned into MEDIA tab.

## 2025-05-14 DIAGNOSIS — K60.30 ANAL FISTULA: Primary | ICD-10-CM

## 2025-05-14 RX ORDER — ACETAMINOPHEN 500 MG
1000 TABLET ORAL EVERY 6 HOURS PRN
Qty: 30 TABLET | Refills: 0 | Status: SHIPPED | OUTPATIENT
Start: 2025-05-14

## 2025-05-14 RX ORDER — TRAMADOL HYDROCHLORIDE 50 MG/1
50 TABLET ORAL EVERY 6 HOURS PRN
Qty: 28 TABLET | Refills: 0 | Status: SHIPPED | OUTPATIENT
Start: 2025-05-14 | End: 2025-05-21

## 2025-05-14 RX ORDER — TIZANIDINE 2 MG/1
4 TABLET ORAL EVERY 6 HOURS PRN
Qty: 30 TABLET | Refills: 0 | Status: SHIPPED | OUTPATIENT
Start: 2025-05-14

## 2025-05-15 ENCOUNTER — TELEPHONE (OUTPATIENT)
Dept: SURGERY | Facility: CLINIC | Age: 25
End: 2025-05-15
Payer: COMMERCIAL

## 2025-05-15 NOTE — TELEPHONE ENCOUNTER
Spoke with patient, identified by name and .   Patient last seen 2025  Surgeon for patient Dr. Lux    Pt reason for call : s/p fistulotomy with seton placement on 2025 with Dr. Lux. Patient called wanting to know after care instructions. This nurse advised patient it is ok to clean with soap and water and place gauze or maxi pad in underwear for drainage from seton. Patient denies pain at this time. No other questions or concerns voiced. Encouraged to call office with any other questions or concerns.

## 2025-05-20 ENCOUNTER — TELEPHONE (OUTPATIENT)
Dept: SURGERY | Facility: CLINIC | Age: 25
End: 2025-05-20
Payer: COMMERCIAL

## 2025-05-20 NOTE — TELEPHONE ENCOUNTER
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  Pt states there was a bloody discharge but now there is a little bit of a brown/green discharge and she wanted some reassurance that this is normal.  No fevers, chills or pain.  I reassured patient that the seton is there so all of the drainage drains out.  Also, she is welcome to call us anytime with any questions.  Pt voiced understanding.

## 2025-06-09 ENCOUNTER — APPOINTMENT (OUTPATIENT)
Dept: SURGERY | Facility: CLINIC | Age: 25
End: 2025-06-09
Payer: COMMERCIAL

## 2025-06-10 ENCOUNTER — OFFICE VISIT (OUTPATIENT)
Dept: SURGERY | Facility: CLINIC | Age: 25
End: 2025-06-10
Payer: COMMERCIAL

## 2025-06-10 VITALS
TEMPERATURE: 98.3 F | OXYGEN SATURATION: 98 % | WEIGHT: 157.6 LBS | BODY MASS INDEX: 26.26 KG/M2 | SYSTOLIC BLOOD PRESSURE: 118 MMHG | HEART RATE: 81 BPM | DIASTOLIC BLOOD PRESSURE: 70 MMHG | HEIGHT: 65 IN

## 2025-06-10 DIAGNOSIS — K60.30 ANAL FISTULA: ICD-10-CM

## 2025-06-10 DIAGNOSIS — Z09 POSTOPERATIVE EXAMINATION: Primary | ICD-10-CM

## 2025-06-10 DIAGNOSIS — K61.0 PERIANAL ABSCESS: ICD-10-CM

## 2025-06-10 PROCEDURE — 1036F TOBACCO NON-USER: CPT | Performed by: STUDENT IN AN ORGANIZED HEALTH CARE EDUCATION/TRAINING PROGRAM

## 2025-06-10 PROCEDURE — 3008F BODY MASS INDEX DOCD: CPT | Performed by: STUDENT IN AN ORGANIZED HEALTH CARE EDUCATION/TRAINING PROGRAM

## 2025-06-10 PROCEDURE — 99211 OFF/OP EST MAY X REQ PHY/QHP: CPT | Performed by: STUDENT IN AN ORGANIZED HEALTH CARE EDUCATION/TRAINING PROGRAM

## 2025-06-10 ASSESSMENT — ENCOUNTER SYMPTOMS: WOUND: 1

## 2025-06-10 ASSESSMENT — PAIN SCALES - GENERAL: PAINLEVEL_OUTOF10: 0-NO PAIN

## 2025-06-10 ASSESSMENT — PATIENT HEALTH QUESTIONNAIRE - PHQ9
1. LITTLE INTEREST OR PLEASURE IN DOING THINGS: NOT AT ALL
SUM OF ALL RESPONSES TO PHQ9 QUESTIONS 1 AND 2: 0
2. FEELING DOWN, DEPRESSED OR HOPELESS: NOT AT ALL

## 2025-06-10 NOTE — PROGRESS NOTES
Subjective   Patient ID: Ranulfo Reyes is a 25 y.o. female who presents for Post-op (S/p fistulotomy with seton placement on 5/14/2025 at [facility] /Denies pain, constipation/diarrhea /Draining small amount of yellow drainage ).  Status post fistulotomy for intersphincteric fistula with seton placement in abscess cavity.  She reports she had a lot of drainage for the first 2 weeks however this has much improved and now she just has a little bit of serous drainage around the seton.  She has had minimal pain.  No swelling.        Review of Systems   Skin:  Positive for wound.       Objective   Physical Exam  Genitourinary:     Comments: Seton in the left anterior perianal space.  Fistulotomy site well-healing, a small amount of fibrinous mucus around the seton        Assessment/Plan   Problem List Items Addressed This Visit    None  Visit Diagnoses         Codes      Postoperative examination    -  Primary Z09      Anal fistula     K60.30    Relevant Orders    MR rectum w and wo IV contrast      Perianal abscess     K61.0    Relevant Orders    MR rectum w and wo IV contrast        Overall much improved.  No longer having any pain or swelling.  Drainage has improved but not completely resolved.  She did have a large abscess cavity that was drained.  We discussed removing the seton today versus performing an MRI to evaluate for any residual collection which may reaccumulate once the seton is removed.  Will schedule the MRI and follow-up with me after to discuss the results and plan moving forward.         Mayelin Lux MD 06/10/25

## 2025-06-30 ENCOUNTER — APPOINTMENT (OUTPATIENT)
Dept: RADIOLOGY | Facility: HOSPITAL | Age: 25
End: 2025-06-30
Payer: COMMERCIAL

## 2025-07-02 ENCOUNTER — TELEPHONE (OUTPATIENT)
Dept: SURGERY | Facility: CLINIC | Age: 25
End: 2025-07-02
Payer: COMMERCIAL

## 2025-07-02 NOTE — TELEPHONE ENCOUNTER
Patient scheduled for a follow up with Dr. Lux on Thursday 7/3/2025 for a rectal MRI. MRI originally scheduled 6/30/20025 but was canceled by the patient via [employer] on 6/26/2025. This nurse called and LM for patient to return call to office regarding the need to reschedule MRI and or reschedule follow up appt with Dr. Lux.

## 2025-07-03 ENCOUNTER — OFFICE VISIT (OUTPATIENT)
Dept: SURGERY | Facility: CLINIC | Age: 25
End: 2025-07-03
Payer: COMMERCIAL

## 2025-07-03 ENCOUNTER — OFFICE VISIT (OUTPATIENT)
Dept: URGENT CARE | Age: 25
End: 2025-07-03
Payer: COMMERCIAL

## 2025-07-03 VITALS
OXYGEN SATURATION: 100 % | TEMPERATURE: 98.3 F | SYSTOLIC BLOOD PRESSURE: 150 MMHG | WEIGHT: 160 LBS | HEIGHT: 65 IN | BODY MASS INDEX: 26.66 KG/M2 | HEART RATE: 66 BPM | DIASTOLIC BLOOD PRESSURE: 100 MMHG

## 2025-07-03 VITALS
WEIGHT: 158 LBS | BODY MASS INDEX: 26.29 KG/M2 | TEMPERATURE: 98.2 F | HEART RATE: 77 BPM | RESPIRATION RATE: 16 BRPM | OXYGEN SATURATION: 100 %

## 2025-07-03 DIAGNOSIS — Z09 POSTOPERATIVE EXAMINATION: Primary | ICD-10-CM

## 2025-07-03 DIAGNOSIS — L02.411 ABSCESS OF AXILLA, RIGHT: Primary | ICD-10-CM

## 2025-07-03 PROCEDURE — 1036F TOBACCO NON-USER: CPT | Performed by: STUDENT IN AN ORGANIZED HEALTH CARE EDUCATION/TRAINING PROGRAM

## 2025-07-03 PROCEDURE — 3008F BODY MASS INDEX DOCD: CPT | Performed by: STUDENT IN AN ORGANIZED HEALTH CARE EDUCATION/TRAINING PROGRAM

## 2025-07-03 PROCEDURE — 99211 OFF/OP EST MAY X REQ PHY/QHP: CPT | Performed by: STUDENT IN AN ORGANIZED HEALTH CARE EDUCATION/TRAINING PROGRAM

## 2025-07-03 RX ORDER — SULFAMETHOXAZOLE AND TRIMETHOPRIM 800; 160 MG/1; MG/1
1 TABLET ORAL 2 TIMES DAILY
Qty: 14 TABLET | Refills: 0 | Status: SHIPPED | OUTPATIENT
Start: 2025-07-03 | End: 2025-07-10

## 2025-07-03 RX ORDER — DOXYCYCLINE 100 MG/1
100 CAPSULE ORAL 2 TIMES DAILY
Qty: 14 CAPSULE | Refills: 0 | Status: SHIPPED | OUTPATIENT
Start: 2025-07-03 | End: 2025-07-03 | Stop reason: WASHOUT

## 2025-07-03 ASSESSMENT — PATIENT HEALTH QUESTIONNAIRE - PHQ9
1. LITTLE INTEREST OR PLEASURE IN DOING THINGS: NOT AT ALL
2. FEELING DOWN, DEPRESSED OR HOPELESS: NOT AT ALL
SUM OF ALL RESPONSES TO PHQ9 QUESTIONS 1 AND 2: 0

## 2025-07-03 ASSESSMENT — ENCOUNTER SYMPTOMS
OCCASIONAL FEELINGS OF UNSTEADINESS: 0
LOSS OF SENSATION IN FEET: 0
WOUND: 1
DEPRESSION: 0

## 2025-07-03 ASSESSMENT — PAIN SCALES - GENERAL: PAINLEVEL_OUTOF10: 0-NO PAIN

## 2025-07-03 NOTE — PATIENT INSTRUCTIONS
Thank you for visiting  urgent care.      Monitor for signs and symptoms of worsening infection such as increasing redness, red streaking, swelling, pain, white or yellow drainage from the wound or fever or chills.  If any of these occur follow-up for recheck.  Cleanse the area with gentle soap and water and pat dry.  Apply in warm compress with epsom salt water twice daily for the next several days.

## 2025-07-03 NOTE — PROGRESS NOTES
Subjective   Patient ID: Ranulfo Reyes is a 25 y.o. female. They present today with a chief complaint of Other (Sore/abscess under right arm pit x 2 days . Has had previous that was staph positive same area).    History of Present Illness  See [facility]       History provided by:  Patient   used: No        Past Medical History  Allergies as of 07/03/2025    (No Known Allergies)       Prescriptions Prior to Admission[1]     Medical History[2]    Surgical History[3]     reports that she has never smoked. She has never used smokeless tobacco. She reports current alcohol use of about 2.0 standard drinks of alcohol per week. She reports that she does not use drugs.    Review of Systems  Review of Systems   All other systems reviewed and are negative.                                 Objective    Vitals:    07/03/25 1054   Pulse: 77   Resp: 16   Temp: 36.8 °C (98.2 °F)   TempSrc: Oral   SpO2: 100%   Weight: 71.7 kg (158 lb)     No LMP recorded.    Physical Exam  Vitals and nursing note reviewed.   Constitutional:       General: She is not in acute distress.     Appearance: Normal appearance. She is normal weight. She is not ill-appearing, toxic-appearing or diaphoretic.   HENT:      Head: Normocephalic and atraumatic.      Mouth/Throat:      Mouth: Mucous membranes are moist.   Eyes:      General: No scleral icterus.        Right eye: No discharge.         Left eye: No discharge.      Extraocular Movements: Extraocular movements intact.      Conjunctiva/sclera: Conjunctivae normal.      Pupils: Pupils are equal, round, and reactive to light.   Cardiovascular:      Rate and Rhythm: Normal rate and regular rhythm.      Pulses: Normal pulses.      Heart sounds: Normal heart sounds.   Pulmonary:      Effort: Pulmonary effort is normal. No respiratory distress.   Abdominal:      General: Abdomen is flat.   Musculoskeletal:         General: Tenderness present. Normal range of motion.      Cervical back: Normal  range of motion and neck supple. No rigidity or tenderness.      Comments: Right axilla 1cm subcutaneous nodular structure very TTP with mild overlying erythema.  1 superficial 1cm area of erythema also TTP reportedly drained purulent fluid.  No fluctuance or induration   Lymphadenopathy:      Cervical: No cervical adenopathy.   Skin:     General: Skin is warm and dry.      Capillary Refill: Capillary refill takes less than 2 seconds.      Coloration: Skin is not jaundiced or pale.      Findings: No rash.   Neurological:      General: No focal deficit present.      Mental Status: She is alert.      Gait: Gait normal.   Psychiatric:         Mood and Affect: Mood normal.         Behavior: Behavior normal.         Procedures    Point of Care Test & Imaging Results from this visit  No results found for this visit on 07/03/25.   Imaging  No results found.    Cardiology, Vascular, and Other Imaging  No other imaging results found for the past 2 days      Diagnostic study results (if any) were reviewed by Lisa Bales PA-C.    Assessment/Plan   Allergies, medications, history, and pertinent labs/EKGs/Imaging reviewed by Lisa Bales PA-C.     Medical Decision Making  24 yo F otherwise healthy presents with complaint of right axillary bump and pain.  States she had MRSA in the same area once in the past.  No fevers or chills.  No other systemic symptoms.  Denies chance of preganncy.  Chart review with wound culture from 9/2024 visit with MRSA resistant to tetracycline and oxacillin.  Susceptible for bactrim and clindamycin.  NKDA.  No abscess amenable to I&D at time of visit.  Will treat with bactrim, wound care and warm compresses.  She states razor may have been old, recommended shaving with electric trimmers.  No features sepsis, extensive abscess or cellulitis, necrotizing fascitis or other emergency.  Encouraged follow-up with primary care provider.  Discussed expected course, indications for return or for  presentation to emergency department.  Discharged good condition agreeable to plan as discussed.      Orders and Diagnoses  Diagnoses and all orders for this visit:  Abscess of axilla, right  -     doxycycline (Vibramycin) 100 mg capsule; Take 1 capsule (100 mg) by mouth 2 times a day for 7 days. Take with at least 8 ounces (large glass) of water, do not lie down for 30 minutes after      Medical Admin Record      Patient disposition: Home    Electronically signed by Lisa Bales PA-C  11:16 AM           [1] (Not in a hospital admission)   [2]   Past Medical History:  Diagnosis Date    Bipolar disorder    [3]   Past Surgical History:  Procedure Laterality Date    ANAL FISTULOTOMY N/A 05/14/2025    Fistulotomy with seton placement- Dr. Lux    HEMORRHOID SURGERY  06/2024    OTHER SURGICAL HISTORY  11/04/2020    [place] tooth extraction

## 2025-07-03 NOTE — PROGRESS NOTES
Subjective   Patient ID: Ranulfo Reyes is a 25 y.o. female who presents for Post-op (Pt is s/p fistulotomy with Seton placement at [facility] on 5/14/25.  Pt has not had MRI yet.  Insurance would not cover it.  ).  Status post fistula repair.  Seton was left in the perianal space for an abscess.  During last clinic visit a month ago she was having still more drainage than expected so an MRI was scheduled.  The MRI was canceled due to insurance reasons.  She reports she has had minimal drainage from around the seton.  No pain or swelling.        Review of Systems   Skin:  Positive for wound.       Objective   Physical Exam  Genitourinary:     Comments: Seton in the left anterior perianal space, no drainage noted today, no induration or pain        Assessment/Plan   Problem List Items Addressed This Visit    None  Visit Diagnoses         Codes      Postoperative examination    -  Primary Z09          Status post fistulotomy of intersphincteric fistula.  A seton was left for an abscess that remained in the perianal space.  The drainage has significantly improved.  Seton was removed at the bedside without issue.  She is here today with her mom, all questions were answered.  Can follow-up with me with any questions or recurrent symptoms.       Mayelin Lux MD

## 2025-07-04 ENCOUNTER — TELEPHONE (OUTPATIENT)
Dept: URGENT CARE | Age: 25
End: 2025-07-04

## 2025-07-07 ENCOUNTER — OFFICE VISIT (OUTPATIENT)
Dept: URGENT CARE | Age: 25
End: 2025-07-07
Payer: COMMERCIAL

## 2025-07-07 VITALS
DIASTOLIC BLOOD PRESSURE: 89 MMHG | HEART RATE: 80 BPM | SYSTOLIC BLOOD PRESSURE: 145 MMHG | BODY MASS INDEX: 26.33 KG/M2 | RESPIRATION RATE: 16 BRPM | TEMPERATURE: 98.1 F | WEIGHT: 158 LBS | HEIGHT: 65 IN | OXYGEN SATURATION: 98 %

## 2025-07-07 DIAGNOSIS — L02.411 ABSCESS OF AXILLA, RIGHT: Primary | ICD-10-CM

## 2025-07-07 RX ORDER — CLINDAMYCIN HYDROCHLORIDE 300 MG/1
300 CAPSULE ORAL 3 TIMES DAILY
Qty: 30 CAPSULE | Refills: 0 | Status: SHIPPED | OUTPATIENT
Start: 2025-07-07 | End: 2025-07-17

## 2025-07-07 RX ORDER — MUPIROCIN 20 MG/G
OINTMENT TOPICAL
Qty: 22 G | Refills: 0 | Status: SHIPPED | OUTPATIENT
Start: 2025-07-07 | End: 2025-07-17

## 2025-07-07 ASSESSMENT — ENCOUNTER SYMPTOMS
COLOR CHANGE: 1
WOUND: 1

## 2025-07-07 ASSESSMENT — PATIENT HEALTH QUESTIONNAIRE - PHQ9
1. LITTLE INTEREST OR PLEASURE IN DOING THINGS: NOT AT ALL
SUM OF ALL RESPONSES TO PHQ9 QUESTIONS 1 & 2: 0
2. FEELING DOWN, DEPRESSED OR HOPELESS: NOT AT ALL

## 2025-07-07 NOTE — PROGRESS NOTES
"Subjective   Patient ID: Ranulfo Reyes is a 25 y.o. female. They present today with a chief complaint of Boil In Armpit (PT states she was DX with an axilla abscess on RIGHT side on 7/3 but antibiotics are not helping. PT states its more swollen.).    History of Present Illness  Patient 25-year-old female presented complaining of an abscess under her right armpit.  She presented 4 days ago for the same reason and was started on Bactrim but reports no improvement in symptoms.  She reports history of similar symptoms to the right arm.  The year ago and had a positive result of MRSA and successful treatment with clindamycin.  Patient denies chills, fever, swelling or redness to the right extremity.     Past Medical History  Allergies as of 07/07/2025    (No Known Allergies)       Prescriptions Prior to Admission[1]     Medical History[2]    Surgical History[3]     reports that she has never smoked. She has never used smokeless tobacco. She reports current alcohol use of about 2.0 standard drinks of alcohol per week. She reports that she does not use drugs.    Review of Systems  Review of Systems   Skin:  Positive for color change and wound.   All other systems reviewed and are negative.                                 Objective    Vitals:    07/07/25 1828   BP: 145/89   Pulse: 80   Resp: 16   Temp: 36.7 °C (98.1 °F)   TempSrc: Oral   SpO2: 98%   Weight: 71.7 kg (158 lb)   Height: 1.651 m (5' 5\")     Patient's last menstrual period was 06/16/2025 (approximate).    Physical Exam  Vitals reviewed.   General: Vitals Noted. No distress. Normocephalic.  Cardiovascular:     Heart sounds: Normal heart sounds, S1 normal and S2 normal. No murmur heard.     No friction rub.   Pulmonary:      Effort: Pulmonary effort is normal.      Breath sounds: Normal breath sounds and air entry. Lungs clear to auscultation bilaterally   Musculoskeletal: Moves all extremities, no effusion, no edema.  Skin:     Comments: Single abscess right " axilla approximately 2 cm in diameter erythematous and firm on palpation without discharge at the site.  Neurological:      Cranial Nerves: Cranial nerves 2-12 are intact.      Sensory: No sensory deficit.      Motor: Motor function is intact.      Deep Tendon Reflexes: Reflexes are normal and symmetric.       Incision and Drainage    Date/Time: 7/7/2025 7:44 PM    Performed by: MAHI Fuentes  Authorized by: MAHI Fuentes    Consent:     Consent obtained:  Written    Risks, benefits, and alternatives were discussed: yes      Risks discussed:  Bleeding, incomplete drainage, damage to other organs, pain and infection    Alternatives discussed:  No treatment and delayed treatment  Universal protocol:     Patient identity confirmed:  Verbally with patient  Location:     Type:  Abscess    Location:  Upper extremity    Upper extremity location: right axilla.  Pre-procedure details:     Skin preparation:  Povidone-iodine  Sedation:     Sedation type:  None  Anesthesia:     Anesthesia method:  Local infiltration    Local anesthetic:  Lidocaine 1% w/o epi  Procedure type:     Complexity:  Simple  Procedure details:     Ultrasound guidance: no      Needle aspiration: no      Incision types:  Single straight    Incision depth:  Subcutaneous    Wound management:  Probed and deloculated and irrigated with saline    Drainage:  Purulent    Drainage amount:  Moderate    Wound treatment:  Wound left open    Packing materials:  None  Post-procedure details:     Procedure completion:  Tolerated well, no immediate complications      Point of Care Test & Imaging Results from this visit  No results found for this visit on 07/07/25.   Imaging  No results found.    Cardiology, Vascular, and Other Imaging  No other imaging results found for the past 2 days      Diagnostic study results (if any) were reviewed by MAHI Fuentes.    Assessment/Plan   Allergies, medications, history, and pertinent labs/EKGs/Imaging  reviewed by MAHI Fuentes.     Medical Decision Making  Patient is alert and orient x 3 no acute distress.  Presents due to right axilla abscess that is getting worse.  On presentation examination single about 2 cm in diameter erythematosus but firm abscess to the right axilla.  I&D completed in office today.  See procedure for detail.  Cultures collected during procedure.  Patient was started on clindamycin and mupirocin additionally for topical application.  Patient was advised to stop Bactrim since no improvement in symptoms for the last 4 days.  over-the-counter medications were advised to be used for pain at home.  Significant signs and symptoms were discussed with the patient today warranting presentation to ER in the setting of systemic infection.  Patient was advised to follow-up with dermatology for reoccurrence of symptoms in the future.  Patient verbalized understanding and agreed with the plan.    Orders and Diagnoses  Diagnoses and all orders for this visit:  Abscess of axilla, right  -     clindamycin (Cleocin HCL) 300 mg capsule; Take 1 capsule (300 mg) by mouth 3 times a day for 10 days.  -     mupirocin (Bactroban) 2 % ointment; Apply topically 3 times a day for 10 days.  -     [facility] CULTURE, AEROBIC AND ANAEROBIC W/GRAM STAIN  Other orders  -     Incision and Drainage      Medical Admin Record      Patient disposition: Home    Electronically signed by MAHI Fuentes  7:46 PM           [1] (Not in a hospital admission)   [2]   Past Medical History:  Diagnosis Date    Bipolar disorder    [3]   Past Surgical History:  Procedure Laterality Date    ANAL FISTULOTOMY N/A 05/14/2025    Fistulotomy with seton placement- Dr. Lux    HEMORRHOID SURGERY  06/2024    OTHER SURGICAL HISTORY  11/04/2020    [place] tooth extraction

## 2025-07-11 LAB
BACTERIA SPEC AEROBE CULT: ABNORMAL
BACTERIA SPEC ANAEROBE CULT: ABNORMAL

## 2025-07-14 LAB
BACTERIA SPEC AEROBE CULT: ABNORMAL
BACTERIA SPEC ANAEROBE CULT: ABNORMAL